# Patient Record
(demographics unavailable — no encounter records)

---

## 2024-12-09 NOTE — ASSESSMENT
[FreeTextEntry1] : Aortic aneurysm- stable by MRI May 2017 @ 44mm.  Was 46mm in 2015 by Echo and CT.  was 50mm June 2019 on CT. Continue BB. S/p AVR 8/1/19.  Some chest wall pain reviewed options.  Ao 38 mm on echo Oct 2021, 36 on echo Sept 2022, 43 at S of Valsalva Sept 2022. Updated 10/3/22 widest Ao measurement is 38 mm.  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  Echo today with color flow, no shunt detected. Carotid US pending.  Add ASA 81 mg.   HTN- Toprol increased to 50mg on 5/31/19, On 25 mg, HR 61, will leave at 25 mg.  Bicuspid aortic valve- S/p AVR 8/1/19. Will follow echo post op.  Last echo Oct 2023.  Pancreatic Cyst- stable in size by most recent MRI, sent to Dr Arnulfo Good  Prediabetes- diet reviewed 6.2, 6.1. Diet reviewed.  6.3 in April 2023. Diet reviewed.  6.0.  HLD- On statin.  LDL 88 April 2023.  96 and 111 Oct 2024 with PCP.  Health Maintainence- Colonoscopy Summer 2016,  [place] Dec 2018,  Colonoscopy Nov 3rd, 2021 with Dr Adebayo Lin,

## 2024-12-09 NOTE — REASON FOR VISIT
[FreeTextEntry1] : 57 y/o M with a bicuspid aortic valve and dilated aorta.  It measured 46mm by echo and CT March 2015.  Pt had an MRI at [facility] May 2017 yielding a size of 44mm. A second issue is a stable pancreatic cyst of ? significance, seen by Dr oGod  EKG: NSR, normal axis and intervals,  ST-Tw abnormalities. 11/8/19 8/9/19 s/p AVR 1 week ago @ [facility].    11/8/19 Some pain at incision site. 9/23/20 Doing well 10/18/21 Continues to do well, had both Covid shots, upcoming colonoscopy 8/22/22 Seen by Dr Calero 4/27/23 Had echo and CT 10/3/23 called last month with visual c/o c/w Amaurosis.  Pt does not have true ocular migraines.  Pt had 1 episode of Am Fugax, lasting 10-15 min.   4/29/24  BP excellent. 12/9/24  Cr 1.52, 1.60 with PCP, , 186, LDL 96, 111.  A1c 6.0.

## 2024-12-09 NOTE — PHYSICAL EXAM
[General Appearance - Well Developed] : well developed [Normal Appearance] : normal appearance [Well Groomed] : well groomed [General Appearance - Well Nourished] : well nourished [No Deformities] : no deformities [General Appearance - In No Acute Distress] : no acute distress [Normal Conjunctiva] : the conjunctiva exhibited no abnormalities [Eyelids - No Xanthelasma] : the eyelids demonstrated no xanthelasmas [Respiration, Rhythm And Depth] : normal respiratory rhythm and effort [Exaggerated Use Of Accessory Muscles For Inspiration] : no accessory muscle use [Auscultation Breath Sounds / Voice Sounds] : lungs were clear to auscultation bilaterally [Heart Rate And Rhythm] : heart rate and rhythm were normal [Heart Sounds] : normal S1 and S2 [Abdomen Soft] : soft [Abdomen Tenderness] : non-tender [Abdomen Mass (___ Cm)] : no abdominal mass palpated [Abnormal Walk] : normal gait [Gait - Sufficient For Exercise Testing] : the gait was sufficient for exercise testing [Nail Clubbing] : no clubbing of the fingernails [Cyanosis, Localized] : no localized cyanosis [Petechial Hemorrhages (___cm)] : no petechial hemorrhages [Skin Color & Pigmentation] : normal skin color and pigmentation [] : no rash [No Venous Stasis] : no venous stasis [Skin Lesions] : no skin lesions [No Skin Ulcers] : no skin ulcer [No Xanthoma] : no  xanthoma was observed [Oriented To Time, Place, And Person] : oriented to person, place, and time [Affect] : the affect was normal [Mood] : the mood was normal [No Anxiety] : not feeling anxious [FreeTextEntry1] : umbilical hernia